# Patient Record
Sex: FEMALE | Race: WHITE | URBAN - METROPOLITAN AREA
[De-identification: names, ages, dates, MRNs, and addresses within clinical notes are randomized per-mention and may not be internally consistent; named-entity substitution may affect disease eponyms.]

---

## 2021-06-08 ENCOUNTER — OFFICE VISIT (OUTPATIENT)
Dept: URBAN - METROPOLITAN AREA CLINIC 33 | Facility: CLINIC | Age: 81
End: 2021-06-08
Payer: COMMERCIAL

## 2021-06-08 DIAGNOSIS — H53.19 OTHER SUBJECTIVE VISUAL DISTURBANCES: Primary | ICD-10-CM

## 2021-06-08 DIAGNOSIS — D49.81 NEOPLASM OF CHOROID: ICD-10-CM

## 2021-06-08 DIAGNOSIS — H43.813 VITREOUS DEGENERATION, BILATERAL: ICD-10-CM

## 2021-06-08 DIAGNOSIS — Z96.1 PRESENCE OF INTRAOCULAR LENS: ICD-10-CM

## 2021-06-08 DIAGNOSIS — H35.363 DRUSEN (DEGENERATIVE) OF MACULA, BILATERAL: ICD-10-CM

## 2021-06-08 PROCEDURE — 99204 OFFICE O/P NEW MOD 45 MIN: CPT | Performed by: OPTOMETRIST

## 2021-06-08 ASSESSMENT — INTRAOCULAR PRESSURE
OD: 16
OS: 16

## 2021-06-08 ASSESSMENT — KERATOMETRY
OS: 45.13
OD: 45.25

## 2021-06-08 NOTE — IMPRESSION/PLAN
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 Low  risk malignancy. No treatment needed at this time. Monitor condition yearly.

## 2021-06-08 NOTE — IMPRESSION/PLAN
Impression: Drusen (degenerative) of macula, bilateral: H35.363. Plan: Discussed diagnosis with patient. No treatment recommended at this time. Monitor condition yearly.

## 2021-06-09 NOTE — IMPRESSION/PLAN
Impression: Other subjective visual disturbances: H53.19. Plan: BCVA OD 20/50 at today's exam. Patient reports vision OD feels worse over the last few weeks/months.  RTC 4 weeks for undilated refraction and corneal topography

## 2021-06-09 NOTE — IMPRESSION/PLAN
Impression: Vitreous degeneration, bilateral: H43.813. Plan: Discussed condition of floaters with patient. No signs of retina holes/tears/or RD. Instructed patient to notify us if symptoms worsen.

## 2022-01-06 ENCOUNTER — OFFICE VISIT (OUTPATIENT)
Dept: URBAN - METROPOLITAN AREA CLINIC 33 | Facility: CLINIC | Age: 82
End: 2022-01-06
Payer: COMMERCIAL

## 2022-01-06 DIAGNOSIS — H52.4 PRESBYOPIA: Primary | ICD-10-CM

## 2022-01-06 PROCEDURE — 92012 INTRM OPH EXAM EST PATIENT: CPT | Performed by: OPTOMETRIST

## 2022-01-06 ASSESSMENT — VISUAL ACUITY
OD: 20/30
OS: 20/20

## 2022-01-06 ASSESSMENT — INTRAOCULAR PRESSURE
OD: 12
OS: 13

## 2022-01-06 NOTE — IMPRESSION/PLAN
Impression: Presbyopia: H52.4. Plan: Issued new glasses RX for BCVA. Monitor for vision changes yearly.

## 2023-02-28 ENCOUNTER — OFFICE VISIT (OUTPATIENT)
Dept: URBAN - METROPOLITAN AREA CLINIC 33 | Facility: CLINIC | Age: 83
End: 2023-02-28
Payer: COMMERCIAL

## 2023-02-28 DIAGNOSIS — H35.3131 BILATERAL NONEXUDATIVE AGE-RELATED MACULAR DEGENERATION, EARLY DRY STAGE: Primary | ICD-10-CM

## 2023-02-28 DIAGNOSIS — H52.4 PRESBYOPIA: ICD-10-CM

## 2023-02-28 DIAGNOSIS — D49.81 NEOPLASM OF CHOROID: ICD-10-CM

## 2023-02-28 PROCEDURE — 99213 OFFICE O/P EST LOW 20 MIN: CPT | Performed by: OPTOMETRIST

## 2023-02-28 ASSESSMENT — INTRAOCULAR PRESSURE
OD: 15
OS: 15

## 2023-02-28 ASSESSMENT — VISUAL ACUITY
OS: 20/20
OD: 20/30

## 2023-02-28 NOTE — IMPRESSION/PLAN
Impression: Bilateral nonexudative age-related macular degeneration, early dry stage: H35.3131. Plan: Discussed diagnosis in detail with patient. Advised patient condition is stable. Ordered and reviewed Optos with patient. Will continue to monitor vision and the patient has been instructed to call with any vision changes.

## 2023-02-28 NOTE — IMPRESSION/PLAN
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 Low risk malignancy. No treatment needed.

## 2025-04-11 ENCOUNTER — OFFICE VISIT (OUTPATIENT)
Dept: URBAN - METROPOLITAN AREA CLINIC 33 | Facility: CLINIC | Age: 85
End: 2025-04-11
Payer: MEDICARE

## 2025-04-11 DIAGNOSIS — H35.3121 NEXDTVE AGE-RELATED MCLR DEGN, LEFT EYE, EARLY DRY STAGE: ICD-10-CM

## 2025-04-11 DIAGNOSIS — Z96.1 PRESENCE OF INTRAOCULAR LENS: ICD-10-CM

## 2025-04-11 DIAGNOSIS — H43.813 VITREOUS DEGENERATION, BILATERAL: ICD-10-CM

## 2025-04-11 DIAGNOSIS — H35.3211 EXDTVE AGE-REL MCLR DEGN, RIGHT EYE, WITH ACTV CHRDL NEOVAS: Primary | ICD-10-CM

## 2025-04-11 DIAGNOSIS — D49.81 NEOPLASM OF CHOROID: ICD-10-CM

## 2025-04-11 PROCEDURE — 99214 OFFICE O/P EST MOD 30 MIN: CPT

## 2025-04-11 ASSESSMENT — VISUAL ACUITY
OD: 20/70
OS: 20/30

## 2025-04-11 ASSESSMENT — INTRAOCULAR PRESSURE
OS: 13
OD: 12

## 2025-04-11 ASSESSMENT — KERATOMETRY
OS: 45.63
OD: 45.75